# Patient Record
Sex: FEMALE | Race: WHITE | Employment: FULL TIME | URBAN - METROPOLITAN AREA
[De-identification: names, ages, dates, MRNs, and addresses within clinical notes are randomized per-mention and may not be internally consistent; named-entity substitution may affect disease eponyms.]

---

## 2017-10-17 PROBLEM — E55.9 VITAMIN D DEFICIENCY: Status: ACTIVE | Noted: 2017-10-17

## 2017-11-02 PROBLEM — R10.11 RUQ PAIN: Status: ACTIVE | Noted: 2017-11-02

## 2018-09-27 DIAGNOSIS — Z00.00 ANNUAL PHYSICAL EXAM: ICD-10-CM

## 2018-09-27 DIAGNOSIS — E55.9 VITAMIN D DEFICIENCY: ICD-10-CM

## 2018-09-27 PROBLEM — L03.115 CELLULITIS OF RIGHT LEG: Status: ACTIVE | Noted: 2018-09-27

## 2018-09-27 LAB
BASOPHILS ABSOLUTE: 0.1 K/UL (ref 0–0.2)
BASOPHILS RELATIVE PERCENT: 0.9 %
EOSINOPHILS ABSOLUTE: 0.1 K/UL (ref 0–0.6)
EOSINOPHILS RELATIVE PERCENT: 1.2 %
HCT VFR BLD CALC: 41.6 % (ref 36–48)
HEMOGLOBIN: 13.7 G/DL (ref 12–16)
LYMPHOCYTES ABSOLUTE: 2.1 K/UL (ref 1–5.1)
LYMPHOCYTES RELATIVE PERCENT: 30.4 %
MCH RBC QN AUTO: 30.4 PG (ref 26–34)
MCHC RBC AUTO-ENTMCNC: 32.9 G/DL (ref 31–36)
MCV RBC AUTO: 92.6 FL (ref 80–100)
MONOCYTES ABSOLUTE: 0.5 K/UL (ref 0–1.3)
MONOCYTES RELATIVE PERCENT: 7.1 %
NEUTROPHILS ABSOLUTE: 4.2 K/UL (ref 1.7–7.7)
NEUTROPHILS RELATIVE PERCENT: 60.4 %
PDW BLD-RTO: 13.8 % (ref 12.4–15.4)
PLATELET # BLD: 198 K/UL (ref 135–450)
PMV BLD AUTO: 9.9 FL (ref 5–10.5)
RBC # BLD: 4.49 M/UL (ref 4–5.2)
WBC # BLD: 6.9 K/UL (ref 4–11)

## 2018-09-28 LAB
A/G RATIO: 1.4 (ref 1.1–2.2)
ALBUMIN SERPL-MCNC: 3.9 G/DL (ref 3.4–5)
ALP BLD-CCNC: 72 U/L (ref 40–129)
ALT SERPL-CCNC: 20 U/L (ref 10–40)
ANION GAP SERPL CALCULATED.3IONS-SCNC: 15 MMOL/L (ref 3–16)
AST SERPL-CCNC: 19 U/L (ref 15–37)
BILIRUB SERPL-MCNC: 0.4 MG/DL (ref 0–1)
BUN BLDV-MCNC: 10 MG/DL (ref 7–20)
CALCIUM SERPL-MCNC: 9.1 MG/DL (ref 8.3–10.6)
CHLORIDE BLD-SCNC: 102 MMOL/L (ref 99–110)
CHOLESTEROL, TOTAL: 142 MG/DL (ref 0–199)
CO2: 20 MMOL/L (ref 21–32)
CREAT SERPL-MCNC: 1 MG/DL (ref 0.6–1.1)
GFR AFRICAN AMERICAN: >60
GFR NON-AFRICAN AMERICAN: >60
GLOBULIN: 2.7 G/DL
GLUCOSE BLD-MCNC: 97 MG/DL (ref 70–99)
HDLC SERPL-MCNC: 45 MG/DL (ref 40–60)
LDL CHOLESTEROL CALCULATED: 84 MG/DL
MAGNESIUM: 2.1 MG/DL (ref 1.8–2.4)
POTASSIUM SERPL-SCNC: 4.3 MMOL/L (ref 3.5–5.1)
SODIUM BLD-SCNC: 137 MMOL/L (ref 136–145)
T3 FREE: 3 PG/ML (ref 2.3–4.2)
T4 FREE: 1.1 NG/DL (ref 0.9–1.8)
TOTAL PROTEIN: 6.6 G/DL (ref 6.4–8.2)
TRIGL SERPL-MCNC: 66 MG/DL (ref 0–150)
TSH SERPL DL<=0.05 MIU/L-ACNC: 3.74 UIU/ML (ref 0.27–4.2)
VITAMIN D 25-HYDROXY: 22.6 NG/ML
VLDLC SERPL CALC-MCNC: 13 MG/DL

## 2019-12-19 DIAGNOSIS — L67.8 ABNORMAL FACIAL HAIR: ICD-10-CM

## 2019-12-19 DIAGNOSIS — R74.01 ELEVATED AST (SGOT): ICD-10-CM

## 2019-12-19 LAB
A/G RATIO: 1.4 (ref 1.1–2.2)
ALBUMIN SERPL-MCNC: 3.7 G/DL (ref 3.4–5)
ALP BLD-CCNC: 54 U/L (ref 40–129)
ALT SERPL-CCNC: 8 U/L (ref 10–40)
ANION GAP SERPL CALCULATED.3IONS-SCNC: 12 MMOL/L (ref 3–16)
AST SERPL-CCNC: 11 U/L (ref 15–37)
BASOPHILS ABSOLUTE: 0 K/UL (ref 0–0.2)
BASOPHILS RELATIVE PERCENT: 0.3 %
BILIRUB SERPL-MCNC: 0.3 MG/DL (ref 0–1)
BUN BLDV-MCNC: 12 MG/DL (ref 7–20)
CALCIUM SERPL-MCNC: 8.8 MG/DL (ref 8.3–10.6)
CHLORIDE BLD-SCNC: 104 MMOL/L (ref 99–110)
CHOLESTEROL, TOTAL: 149 MG/DL (ref 0–199)
CO2: 25 MMOL/L (ref 21–32)
CREAT SERPL-MCNC: 0.8 MG/DL (ref 0.6–1.1)
EOSINOPHILS ABSOLUTE: 0.1 K/UL (ref 0–0.6)
EOSINOPHILS RELATIVE PERCENT: 1.4 %
GFR AFRICAN AMERICAN: >60
GFR NON-AFRICAN AMERICAN: >60
GLOBULIN: 2.7 G/DL
GLUCOSE BLD-MCNC: 96 MG/DL (ref 70–99)
HCT VFR BLD CALC: 40.9 % (ref 36–48)
HDLC SERPL-MCNC: 62 MG/DL (ref 40–60)
HEMOGLOBIN: 13.4 G/DL (ref 12–16)
LDL CHOLESTEROL CALCULATED: 71 MG/DL
LYMPHOCYTES ABSOLUTE: 2.6 K/UL (ref 1–5.1)
LYMPHOCYTES RELATIVE PERCENT: 31 %
MCH RBC QN AUTO: 30.1 PG (ref 26–34)
MCHC RBC AUTO-ENTMCNC: 32.8 G/DL (ref 31–36)
MCV RBC AUTO: 91.9 FL (ref 80–100)
MONOCYTES ABSOLUTE: 0.4 K/UL (ref 0–1.3)
MONOCYTES RELATIVE PERCENT: 5.3 %
NEUTROPHILS ABSOLUTE: 5.2 K/UL (ref 1.7–7.7)
NEUTROPHILS RELATIVE PERCENT: 62 %
PDW BLD-RTO: 13 % (ref 12.4–15.4)
PLATELET # BLD: 207 K/UL (ref 135–450)
PMV BLD AUTO: 10.3 FL (ref 5–10.5)
POTASSIUM SERPL-SCNC: 4.8 MMOL/L (ref 3.5–5.1)
RBC # BLD: 4.45 M/UL (ref 4–5.2)
SODIUM BLD-SCNC: 141 MMOL/L (ref 136–145)
TOTAL PROTEIN: 6.4 G/DL (ref 6.4–8.2)
TRIGL SERPL-MCNC: 81 MG/DL (ref 0–150)
TSH SERPL DL<=0.05 MIU/L-ACNC: 2.5 UIU/ML (ref 0.27–4.2)
VLDLC SERPL CALC-MCNC: 16 MG/DL
WBC # BLD: 8.4 K/UL (ref 4–11)

## 2019-12-21 LAB — TESTOSTERONE TOTAL: 31 NG/DL (ref 20–70)

## 2021-02-25 PROBLEM — O14.93 PRE-ECLAMPSIA IN THIRD TRIMESTER: Status: ACTIVE | Noted: 2021-02-25

## 2023-07-17 ENCOUNTER — OFFICE VISIT (OUTPATIENT)
Dept: FAMILY MEDICINE CLINIC | Age: 33
End: 2023-07-17
Payer: COMMERCIAL

## 2023-07-17 VITALS
HEIGHT: 66 IN | BODY MASS INDEX: 40.5 KG/M2 | OXYGEN SATURATION: 97 % | TEMPERATURE: 97.1 F | RESPIRATION RATE: 16 BRPM | SYSTOLIC BLOOD PRESSURE: 123 MMHG | HEART RATE: 88 BPM | DIASTOLIC BLOOD PRESSURE: 83 MMHG | WEIGHT: 252 LBS

## 2023-07-17 DIAGNOSIS — Z01.818 PREOPERATIVE EXAMINATION: Primary | ICD-10-CM

## 2023-07-17 DIAGNOSIS — E66.01 MORBID OBESITY WITH BMI OF 40.0-44.9, ADULT (HCC): ICD-10-CM

## 2023-07-17 PROCEDURE — 99213 OFFICE O/P EST LOW 20 MIN: CPT | Performed by: STUDENT IN AN ORGANIZED HEALTH CARE EDUCATION/TRAINING PROGRAM

## 2023-07-17 SDOH — ECONOMIC STABILITY: INCOME INSECURITY: HOW HARD IS IT FOR YOU TO PAY FOR THE VERY BASICS LIKE FOOD, HOUSING, MEDICAL CARE, AND HEATING?: NOT HARD AT ALL

## 2023-07-17 SDOH — ECONOMIC STABILITY: HOUSING INSECURITY
IN THE LAST 12 MONTHS, WAS THERE A TIME WHEN YOU DID NOT HAVE A STEADY PLACE TO SLEEP OR SLEPT IN A SHELTER (INCLUDING NOW)?: NO

## 2023-07-17 SDOH — ECONOMIC STABILITY: FOOD INSECURITY: WITHIN THE PAST 12 MONTHS, YOU WORRIED THAT YOUR FOOD WOULD RUN OUT BEFORE YOU GOT MONEY TO BUY MORE.: NEVER TRUE

## 2023-07-17 SDOH — ECONOMIC STABILITY: FOOD INSECURITY: WITHIN THE PAST 12 MONTHS, THE FOOD YOU BOUGHT JUST DIDN'T LAST AND YOU DIDN'T HAVE MONEY TO GET MORE.: NEVER TRUE

## 2023-07-17 ASSESSMENT — PATIENT HEALTH QUESTIONNAIRE - PHQ9
SUM OF ALL RESPONSES TO PHQ QUESTIONS 1-9: 0
SUM OF ALL RESPONSES TO PHQ9 QUESTIONS 1 & 2: 0
7. TROUBLE CONCENTRATING ON THINGS, SUCH AS READING THE NEWSPAPER OR WATCHING TELEVISION: 0
6. FEELING BAD ABOUT YOURSELF - OR THAT YOU ARE A FAILURE OR HAVE LET YOURSELF OR YOUR FAMILY DOWN: 0
8. MOVING OR SPEAKING SO SLOWLY THAT OTHER PEOPLE COULD HAVE NOTICED. OR THE OPPOSITE, BEING SO FIGETY OR RESTLESS THAT YOU HAVE BEEN MOVING AROUND A LOT MORE THAN USUAL: 0
2. FEELING DOWN, DEPRESSED OR HOPELESS: 0
SUM OF ALL RESPONSES TO PHQ QUESTIONS 1-9: 0
SUM OF ALL RESPONSES TO PHQ QUESTIONS 1-9: 0
5. POOR APPETITE OR OVEREATING: 0
3. TROUBLE FALLING OR STAYING ASLEEP: 0
1. LITTLE INTEREST OR PLEASURE IN DOING THINGS: 0
SUM OF ALL RESPONSES TO PHQ QUESTIONS 1-9: 0
1. LITTLE INTEREST OR PLEASURE IN DOING THINGS: 0
SUM OF ALL RESPONSES TO PHQ QUESTIONS 1-9: 0
SUM OF ALL RESPONSES TO PHQ QUESTIONS 1-9: 0
4. FEELING TIRED OR HAVING LITTLE ENERGY: 0
SUM OF ALL RESPONSES TO PHQ QUESTIONS 1-9: 0
2. FEELING DOWN, DEPRESSED OR HOPELESS: 0
SUM OF ALL RESPONSES TO PHQ QUESTIONS 1-9: 0
9. THOUGHTS THAT YOU WOULD BE BETTER OFF DEAD, OR OF HURTING YOURSELF: 0
SUM OF ALL RESPONSES TO PHQ9 QUESTIONS 1 & 2: 0

## 2023-07-17 NOTE — PROGRESS NOTES
(Circ. [id number], 1999), the patient's risk factors for cardiac complications : RCI RISK CLASS I (0 risk factors, risk of major cardiac compl. appr. 0.5%)    Current medications which may produce withdrawal symptoms if withheld perioperatively: none      Diagnosis Orders   1. Preoperative examination        2. Morbid obesity with BMI of 40.0-44.9, adult ([address])             Plan:   1. Preoperative examination  Patient is a healthy 34year old female. - Presently Clinically Stable for Scheduled Surgery.  - No contraindications to planned surgery; Pending preop workup and Exam via [facility]     2. Morbid obesity with BMI of 40.0-44.9, adult ([address])  Plan for gastric sleeve .     Gwendolyn Brambila MD  7/17/23

## 2023-09-26 ENCOUNTER — OFFICE VISIT (OUTPATIENT)
Dept: FAMILY MEDICINE CLINIC | Age: 33
End: 2023-09-26
Payer: COMMERCIAL

## 2023-09-26 VITALS
WEIGHT: 226.2 LBS | OXYGEN SATURATION: 98 % | BODY MASS INDEX: 37.07 KG/M2 | DIASTOLIC BLOOD PRESSURE: 73 MMHG | SYSTOLIC BLOOD PRESSURE: 105 MMHG | RESPIRATION RATE: 16 BRPM | TEMPERATURE: 97.3 F | HEART RATE: 65 BPM

## 2023-09-26 DIAGNOSIS — M53.3 PAIN OF LEFT SACROILIAC JOINT: Primary | ICD-10-CM

## 2023-09-26 PROCEDURE — 99213 OFFICE O/P EST LOW 20 MIN: CPT | Performed by: FAMILY MEDICINE

## 2023-09-26 RX ORDER — TIZANIDINE 4 MG/1
4 TABLET ORAL EVERY 8 HOURS PRN
Qty: 9 TABLET | Refills: 0 | Status: SHIPPED | OUTPATIENT
Start: 2023-09-26 | End: 2023-09-29

## 2023-09-26 RX ORDER — LIDOCAINE AND PRILOCAINE 25; 25 MG/G; MG/G
CREAM TOPICAL
Qty: 1 EACH | Refills: 0 | Status: SHIPPED | OUTPATIENT
Start: 2023-09-26

## 2023-09-26 RX ORDER — METHYLPREDNISOLONE 4 MG/1
TABLET ORAL
Qty: 1 KIT | Refills: 0 | Status: SHIPPED | OUTPATIENT
Start: 2023-09-26 | End: 2023-10-02

## 2023-09-26 RX ORDER — MAGNESIUM 200 MG
TABLET ORAL
COMMUNITY
Start: 2023-09-07

## 2023-09-26 ASSESSMENT — ENCOUNTER SYMPTOMS: BACK PAIN: 1

## 2023-09-28 ENCOUNTER — TELEPHONE (OUTPATIENT)
Dept: FAMILY MEDICINE CLINIC | Age: 33
End: 2023-09-28

## 2023-09-28 NOTE — TELEPHONE ENCOUNTER
Pharmacy called to state they need a frequency on the lidocaine-prilocaine RX sent 09/26 for insurance purposes.      lidocaine-prilocaine (EMLA) 2.5-2.5 % cream

## 2023-10-04 DIAGNOSIS — M53.3 PAIN OF LEFT SACROILIAC JOINT: Primary | ICD-10-CM

## 2023-10-13 ENCOUNTER — OFFICE VISIT (OUTPATIENT)
Dept: ORTHOPEDIC SURGERY | Age: 33
End: 2023-10-13
Payer: COMMERCIAL

## 2023-10-13 ENCOUNTER — TELEPHONE (OUTPATIENT)
Dept: ORTHOPEDIC SURGERY | Age: 33
End: 2023-10-13

## 2023-10-13 VITALS — HEIGHT: 66 IN | BODY MASS INDEX: 36.32 KG/M2 | WEIGHT: 226 LBS

## 2023-10-13 DIAGNOSIS — M54.50 CHRONIC LOW BACK PAIN, UNSPECIFIED BACK PAIN LATERALITY, UNSPECIFIED WHETHER SCIATICA PRESENT: Primary | ICD-10-CM

## 2023-10-13 DIAGNOSIS — G89.29 CHRONIC LOW BACK PAIN, UNSPECIFIED BACK PAIN LATERALITY, UNSPECIFIED WHETHER SCIATICA PRESENT: Primary | ICD-10-CM

## 2023-10-13 DIAGNOSIS — M62.830 SPASM OF MUSCLE OF LOWER BACK: ICD-10-CM

## 2023-10-13 DIAGNOSIS — G89.29 CHRONIC SI JOINT PAIN: ICD-10-CM

## 2023-10-13 DIAGNOSIS — M53.3 CHRONIC SI JOINT PAIN: ICD-10-CM

## 2023-10-13 DIAGNOSIS — M25.552 LEFT HIP PAIN: ICD-10-CM

## 2023-10-13 DIAGNOSIS — M51.37 DDD (DEGENERATIVE DISC DISEASE), LUMBOSACRAL: ICD-10-CM

## 2023-10-13 PROCEDURE — 99203 OFFICE O/P NEW LOW 30 MIN: CPT | Performed by: PHYSICIAN ASSISTANT

## 2023-10-13 RX ORDER — PREDNISONE 10 MG/1
TABLET ORAL
Qty: 50 TABLET | Refills: 0 | Status: SHIPPED | OUTPATIENT
Start: 2023-10-13 | End: 2023-10-24

## 2023-10-13 RX ORDER — LIDOCAINE 50 MG/G
1 PATCH TOPICAL EVERY 12 HOURS
Qty: 14 PATCH | Refills: 0 | Status: SHIPPED | OUTPATIENT
Start: 2023-10-13

## 2023-10-13 RX ORDER — METHOCARBAMOL 750 MG/1
750 TABLET, FILM COATED ORAL EVERY 12 HOURS PRN
Qty: 20 TABLET | Refills: 0 | Status: SHIPPED | OUTPATIENT
Start: 2023-10-13 | End: 2023-10-23

## 2023-10-13 NOTE — PROGRESS NOTES
Referring Provider:  Natalya Teague DO    CHIEF COMPLAINT:    Chief Complaint   Patient presents with    New Patient     LUMBAR       HISTORY OF PRESENT ILLNESS:    Ms. Charlie Bullock  is a pleasant 28 y.o. female here for consultation regarding her LBP. Patient has her low back pain for the past 2 to 3 years with no associated injury. Is predominantly in the left side. 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 This all stems from an injury at work when she was 23. 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 Mostly with his job she is sitting throughout the day. She describes her symptoms as constant, achy and stabbing. Symptoms are 8/10 and radiates into the left side of the glutes. It is worse with trunk rotation. Symptoms are also worse with prolonged standing, position changes, leaning forward, and ambulating. Symptoms are improved to a 6/10 with sitting or lying down. She denies any fevers, chills, recent travel, IV drug use, illicit drug use, saddle anesthesia, or bowel or bladder dysfunction.         Current/Past Treatment:   Physical Therapy: None, home exercise program for the past 6 months  Chiropractic:  None   Injection:  None   Medications:    NSAIDS:  Aleve, IBP  Muscle relaxer:  NONE  Steriods:   MDP  Neuropathic medications:  NONE  Opioids: NONE  Other:  MPHx of 4 epidurals during past pregnancies without relief of low back or labor pains   Surgery/Consult NONE      Past Medical History:   Past Medical History:   Diagnosis Date    Chicken pox     Chronic back pain     Since age 23    DDD (degenerative disc disease), cervical     Depression     no meds    HPV (human papilloma virus) anogenital infection 2008    MRSA exposure         Past Surgical History:     Past Surgical History:   Procedure Laterality Date    EYE SURGERY      Lasik    LASRADHA NICHOLSON

## 2023-10-13 NOTE — TELEPHONE ENCOUNTER
General Question     Subject: MRI REFERRAL  Patient and /or Facility Request: Wing Hassan  Contact Number: [phone]    PT REQ REFERRAL FOR MRI [facility] [place] FAX # [phone]

## 2023-10-16 NOTE — TELEPHONE ENCOUNTER
S/W the patient informing her I was calling regarding her message. Patient was informed I have to change the location on the PA with her insurance. Patient was informed once I get it done that I will fax over everything to [address] [place] [address], [address] [place] [address]  [phone]  FAX: [phone]    Patient was also informed they will reach out to her and schedule her appointment. Patient voiced understanding.

## 2023-10-17 ENCOUNTER — TELEPHONE (OUTPATIENT)
Dept: ORTHOPEDIC SURGERY | Age: 33
End: 2023-10-17

## 2023-10-17 NOTE — TELEPHONE ENCOUNTER
Faxing Patient Information     Facility Name: [facility] Name: Andrea Wong  Contact Number:   Facility Fax Number: [address] ([phone]) CALLED STATING THAT THE PT WANTS TO HAVE HER MRI DONE AT [facility], [address] #[phone]. [facility] NEEDS AN ORDER FAXED TO THEM OF AN  MRI OF THE LUMBAR SPINE WITHOUT CONTRAST.

## 2023-10-18 ENCOUNTER — HOSPITAL ENCOUNTER (OUTPATIENT)
Dept: PHYSICAL THERAPY | Age: 33
Setting detail: THERAPIES SERIES
Discharge: HOME | End: 2023-10-18
Payer: COMMERCIAL

## 2023-10-18 DIAGNOSIS — M53.3 SACROILIAC JOINT DYSFUNCTION: Primary | ICD-10-CM

## 2023-10-18 DIAGNOSIS — G89.29 CHRONIC LEFT-SIDED LOW BACK PAIN WITHOUT SCIATICA: ICD-10-CM

## 2023-10-18 DIAGNOSIS — M54.50 CHRONIC LEFT-SIDED LOW BACK PAIN WITHOUT SCIATICA: ICD-10-CM

## 2023-10-18 PROCEDURE — 97161 PT EVAL LOW COMPLEX 20 MIN: CPT

## 2023-10-18 PROCEDURE — 97110 THERAPEUTIC EXERCISES: CPT

## 2023-10-18 NOTE — PLAN OF CARE
allow independence in all self-care activities. Patient will increase core/lumbopelvic function to allow independence in all self-care activities. Patient will perform normal ADLs without symptoms 75% of the time             Status: [] Progressing: [] Met: [] Not Met: [] Adjusted    TREATMENT PLAN     Frequency/Duration: 1-2x/week for  12  weeks for the following treatment interventions:    Interventions:  [x] Therapeutic exercise including: strength training, ROM, including postural re-education. [x] NMR activation and proprioception, including postural re-education. [x] Manual therapy as indicated to include: PROM, Gr I-IV mobilizations, and STM  [x] Modalities as needed that may include: Cryotherapy and Electrical Stimulation  [x] Patient education on joint protection, postural re-education, activity modification, progression of HEP. HEP instruction: Refer to HEP instructions outlined on the flowsheet on 10/18/2023.     Electronically Signed by Dianna Cai PT  Date: 10/18/2023

## 2023-10-18 NOTE — FLOWSHEET NOTE
[address] [place] [address] and Therapy [address] [place] [address], [address] [place] [address] office: [phone] fax: [phone]      Physical Therapy: TREATMENT/PROGRESS NOTE   Patient: Johnson Greenwood (56 y.o. female)   Treatment Date: 10/18/2023   :  1990 MRN: [id number]   Visit #: 1   Insurance Allowable Auth Needed   60 []Yes    []No    Insurance: Payor: [place] / Plan: Susan Mandril PPO / Product Type: *No Product type* /   Insurance ID: [id number] - ([facility])  Secondary Insurance (if applicable):    Treatment Diagnosis:     ICD-10-CM    1.  Sacroiliac joint dysfunction  M53.3          Medical Diagnosis:    Chronic low back pain, unspecified back pain laterality, unspecified whether sciatica present [M54.50, G89.29]  Chronic SI joint pain [M53.3, G89.29]   Referring Physician: Holland Carranza PA-C  PCP: Carolyn He DO                             Plan of care signed (Y/N): N    Date of Patient follow up with Physician: prn     Progress Report/POC: EVAL today  POC update due: 2023 (OR 10 visits /OR Alfredia Mole, whichever is less)    Precautions/ Contra-indications:                                                                                          Latex allergy:  NO  Pacemaker:    NO  Contraindications for Manipulation: None  Other:  (Cut and paste Precautions/Contraindications from [employer])    Preferred Language for Healthcare:   [x]English       []other:    SUBJECTIVE EXAMINATION     Patient Report/Comments: see eval     Test used Initial score  10/18/23 10/18/2023   Pain Summary VAS 7-9    Functional questionnaire Modified Oswestry [place]    Other:                OBJECTIVE EXAMINATION     Observation:     Test measurements: see eval    Exercises/Interventions:     Therapeutic Ex (30925) Homa Andrews re-education (20530) resistance Sets/time Reps Notes/Cues/Progressions   Hip ADD iso  5\" 10    HL clamshell RTB 3 10    LTR  1 10                                              Pt edu:

## 2023-10-19 NOTE — TELEPHONE ENCOUNTER
S/W Michelle Goodwin  regarding MRI LUMBAR SPINE WO CONTRAST approval and authorization being valid until 11/11/23. Patient was instructed that their MRI needs to be scheduled at [facility] The patient was instructed to contact the facility to schedule . Patient was informed to follow up back in the office with Nidia Miller PA-C to go over MRI results. She voiced understanding.

## 2023-10-23 ENCOUNTER — APPOINTMENT (OUTPATIENT)
Dept: PHYSICAL THERAPY | Age: 33
End: 2023-10-23
Payer: COMMERCIAL

## 2023-10-25 ENCOUNTER — HOSPITAL ENCOUNTER (OUTPATIENT)
Dept: PHYSICAL THERAPY | Age: 33
Setting detail: THERAPIES SERIES
Discharge: HOME | End: 2023-10-25
Payer: COMMERCIAL

## 2023-10-25 PROCEDURE — G0283 ELEC STIM OTHER THAN WOUND: HCPCS

## 2023-10-25 PROCEDURE — 97140 MANUAL THERAPY 1/> REGIONS: CPT

## 2023-10-25 PROCEDURE — 97110 THERAPEUTIC EXERCISES: CPT

## 2023-10-25 NOTE — FLOWSHEET NOTE
[address] [place] [address] and Therapy [address] [place] [address]., [address] [facility], [address] [place] [address] office: [phone] fax: [phone]      Physical Therapy: TREATMENT/PROGRESS NOTE   Patient: Anthony Ureña (31 y.o. female)   Treatment Date: 10/25/2023   :  1990 MRN: [id number]   Visit #: 2   Insurance Allowable Auth Needed   60 []Yes    []No    Insurance: Payor: Michell Guerrero / Plan: Yvon Clancy PPO / Product Type: *No Product type* /   Insurance ID: [id number] - ([facility])  Secondary Insurance (if applicable):    Treatment Diagnosis:     ICD-10-CM    1. Sacroiliac joint dysfunction  M53.3          Medical Diagnosis:    Chronic low back pain, unspecified back pain laterality, unspecified whether sciatica present [M54.50, G89.29]  Chronic SI joint pain [M53.3, G89.29]   Referring Physician: Nathan GILLESPIE PA-C  PCP: Marshall Lopez DO                             Plan of care signed (Y/N): Y    Date of Patient follow up with Physician: prn     Progress Report/POC: NO  POC update due: 2023 (OR 10 visits /OR [address], whichever is less)    Precautions/ Contra-indications:                                                                                          Latex allergy:  NO  Pacemaker:    NO  Contraindications for Manipulation: None  Other:     Preferred Language for Healthcare:   [x]English       []other:    SUBJECTIVE EXAMINATION     Patient Report/Comments: Pt states she has been doing ok. Overall pain remains the same intensity but maybe a little less frequent. Pt does feel a little more movement. Pt has been compliant with HEP and does have ? pain. Pt reports pain feels very nn like, feels pinchy and sharp, especially in to L glute.       Test used Initial score  10/18/23 10/25/2023   Pain Summary VAS 6    Functional questionnaire Modified Oswestry NV    Other:                OBJECTIVE EXAMINATION     Observation: pt is slow to move, guarded with her mobility    Test

## 2023-11-01 ENCOUNTER — HOSPITAL ENCOUNTER (OUTPATIENT)
Dept: PHYSICAL THERAPY | Age: 33
Setting detail: THERAPIES SERIES
Discharge: HOME | End: 2023-11-01
Payer: COMMERCIAL

## 2023-11-01 PROCEDURE — 97140 MANUAL THERAPY 1/> REGIONS: CPT

## 2023-11-01 PROCEDURE — 97110 THERAPEUTIC EXERCISES: CPT

## 2023-11-01 PROCEDURE — G0283 ELEC STIM OTHER THAN WOUND: HCPCS

## 2023-11-01 NOTE — FLOWSHEET NOTE
[address] [place] [address] and Therapy [address] [place] [address], [facility] [address] office: [phone] fax: [phone]      Physical Therapy: TREATMENT/PROGRESS NOTE   Patient: Odilia Martinez (46 y.o. female)   Treatment Date: 2023   :  1990 MRN: [id number]   Visit #: 3   Insurance Allowable Auth Needed   60 []Yes    []No    Insurance: Payor: [facility] / Plan: Joanie Calloway PPO / Product Type: *No Product type* /   Insurance ID: [id number] - ([facility])  Secondary Insurance (if applicable):    Treatment Diagnosis:     ICD-10-CM    1. Sacroiliac joint dysfunction  M53.3          Medical Diagnosis:    Chronic low back pain, unspecified back pain laterality, unspecified whether sciatica present [M54.50, G89.29]  Chronic SI joint pain [M53.3, G89.29]   Referring Physician: Ghulam GILLESPIE PA-C  PCP: Fady Lundy DO                             Plan of care signed (Y/N): Y    Date of Patient follow up with Physician: prn     Progress Report/POC: NO  POC update due: 2023 (OR 10 visits /OR Javier Crawford, whichever is less)    Precautions/ Contra-indications:                                                                                          Latex allergy:  NO  Pacemaker:    NO  Contraindications for Manipulation: None  Other:     Preferred Language for Healthcare:   [x]English       []other:    SUBJECTIVE EXAMINATION     Patient Report/Comments: Pt reports she was kneeling next to her bed last night and felt a pop on the left side of her low back and is now back to feeling constant pain, L sided. Pt reports she was called by [employer] company.       Test used Initial score  10/18/23 2023   Pain Summary VAS 9 7   Functional questionnaire Modified Oswestry NV    Other:                OBJECTIVE EXAMINATION     Observation: pt is slow to move, guarded with her mobility    Test measurements: see eval    Exercises/Interventions:     Therapeutic Ex (54136) Etienne Amaral

## 2023-11-03 ENCOUNTER — OFFICE VISIT (OUTPATIENT)
Dept: ORTHOPEDIC SURGERY | Age: 33
End: 2023-11-03
Payer: COMMERCIAL

## 2023-11-03 VITALS — BODY MASS INDEX: 36.32 KG/M2 | WEIGHT: 226 LBS | HEIGHT: 66 IN

## 2023-11-03 DIAGNOSIS — G89.29 CHRONIC LOW BACK PAIN, UNSPECIFIED BACK PAIN LATERALITY, UNSPECIFIED WHETHER SCIATICA PRESENT: ICD-10-CM

## 2023-11-03 DIAGNOSIS — M54.50 CHRONIC LOW BACK PAIN, UNSPECIFIED BACK PAIN LATERALITY, UNSPECIFIED WHETHER SCIATICA PRESENT: ICD-10-CM

## 2023-11-03 DIAGNOSIS — M51.26 LUMBAR HERNIATED DISC: ICD-10-CM

## 2023-11-03 DIAGNOSIS — M53.3 CHRONIC SI JOINT PAIN: ICD-10-CM

## 2023-11-03 DIAGNOSIS — M51.26 PROTRUSION OF LUMBAR INTERVERTEBRAL DISC: Primary | ICD-10-CM

## 2023-11-03 DIAGNOSIS — M51.37 DDD (DEGENERATIVE DISC DISEASE), LUMBOSACRAL: ICD-10-CM

## 2023-11-03 DIAGNOSIS — G89.29 CHRONIC SI JOINT PAIN: ICD-10-CM

## 2023-11-03 PROCEDURE — 99213 OFFICE O/P EST LOW 20 MIN: CPT | Performed by: PHYSICIAN ASSISTANT

## 2023-11-08 ENCOUNTER — APPOINTMENT (OUTPATIENT)
Dept: PHYSICAL THERAPY | Age: 33
End: 2023-11-08
Payer: COMMERCIAL

## 2023-11-09 ENCOUNTER — HOSPITAL ENCOUNTER (OUTPATIENT)
Dept: PHYSICAL THERAPY | Age: 33
Setting detail: THERAPIES SERIES
Discharge: HOME | End: 2023-11-09
Payer: COMMERCIAL

## 2023-11-09 PROCEDURE — 97110 THERAPEUTIC EXERCISES: CPT

## 2023-11-09 PROCEDURE — 97140 MANUAL THERAPY 1/> REGIONS: CPT

## 2023-11-09 NOTE — FLOWSHEET NOTE
[address] and Therapy [address] [place] [address], [facility] [address] office: [phone] fax: [phone]      Physical Therapy: TREATMENT/PROGRESS NOTE   Patient: Miguelina Martinez (51 y.o. female)   Treatment Date: 2023   :  1990 MRN: [id number]   Visit #: 4  Insurance Allowable Auth Needed   60 []Yes    [x]No    Insurance: Payor: Renee Moran / Plan: Renee Rincon [place] PPO / Product Type: *No Product type* /   Insurance ID: [id number] - ([facility])  Secondary Insurance (if applicable):    Treatment Diagnosis:     ICD-10-CM    1. Sacroiliac joint dysfunction  M53.3          Medical Diagnosis:    Chronic low back pain, unspecified back pain laterality, unspecified whether sciatica present [M54.50, G89.29]  Chronic SI joint pain [M53.3, G89.29]   Referring Physician: Ximena GILLESPIE PA-C  PCP: Reynold Alford DO                             Plan of care signed (Y/N): Y    Date of Patient follow up with Physician: prn     Progress Report/POC: NO  POC update due: 2023 (OR 10 visits /OR [address], whichever is less)    Precautions/ Contra-indications:                                                                                          Latex allergy:  NO  Pacemaker:    NO  Contraindications for Manipulation: None  Other:     Preferred Language for Healthcare:   [x]English       []other:    SUBJECTIVE EXAMINATION     Patient Report/Comments:  Pt notes that she is feeling a little worse today. She notes that she was rolling over in bed a few nights ago and had a sudden increase in pain that has not regressed. Pt notes that prior to this she was at a steady 2-3/10 pain.       Test used Initial score  10/18/23 2023   Pain Summary VAS 9 5-6   Functional questionnaire Modified Oswestry NV    Other:                OBJECTIVE EXAMINATION     Observation: pt is slow to move, guarded with her mobility    Test measurements: see eval    Exercises/Interventions:

## 2023-11-10 NOTE — PROGRESS NOTES
Additional Examinations:   RIGHT LOWER EXTREMITY: Inspection/examination of the right lower extremity does not show any tenderness, deformity or injury. Range of motion is unremarkable. There is no gross instability. There are no rashes, ulcerations or lesions. Strength and tone are normal.  LEFT LOWER EXTREMITY:  Inspection/examination of the left lower extremity does not show any tenderness, deformity or injury. Range of motion is unremarkable. There is no gross instability. There are no rashes, ulcerations or lesions. Strength and tone are normal.      Diagnostic Testing:    Reviewed   MRI of the lumbar spine  Conclusion: L4-L5 shallow central disc protrusion asymmetric to the right with annular fissure gently deforms the ventral dural sac. Mild spinal stenosis. Impression:    Diagnosis Orders   1. Protrusion of lumbar intervertebral disc  Alexia Broussard MD, Orthopedic Surgery (Spine), [place]      2. DDD (degenerative disc disease), lumbosacral  Alexia Broussard MD, Orthopedic Surgery (Spine), [place]      3. Chronic SI joint pain  Alexia Broussard MD, Orthopedic Surgery (Spine), [place]      4. Chronic low back pain, unspecified back pain laterality, unspecified whether sciatica present        5. Lumbar herniated disc              Plan:      1. Medications: No further recommendations for new medications. 2. PT:  Encouraged to continue with HEP/physical therapy. 3. Further studies:   No further studies to be obtained    4. Interventional:  We discussed pursuing a lumbar epidural steroid injection to address the pain. Radiologic imaging and symptoms confirm the pain etiology. Risks, benefits and alternatives of interventional options were discussed. These include and are not limited to bleeding, infection, spinal headache, nerve injury, increased pain and lack of pain relief. The patient verbalized understanding and would like to proceed.   The patient will be

## 2023-11-16 ENCOUNTER — HOSPITAL ENCOUNTER (OUTPATIENT)
Dept: PHYSICAL THERAPY | Age: 33
Setting detail: THERAPIES SERIES
Discharge: HOME | End: 2023-11-16
Payer: COMMERCIAL

## 2023-11-16 PROCEDURE — 97140 MANUAL THERAPY 1/> REGIONS: CPT

## 2023-11-16 PROCEDURE — 97110 THERAPEUTIC EXERCISES: CPT

## 2023-11-16 PROCEDURE — 97112 NEUROMUSCULAR REEDUCATION: CPT

## 2023-11-16 NOTE — FLOWSHEET NOTE
[address] [place] [address] and Therapy [address] [place] [address] office: [phone] fax: [phone]      Physical Therapy: TREATMENT/PROGRESS NOTE   Patient: Charlie Bullock (32 y.o. female)   Treatment Date: 2023   :  1990 MRN: [id number]   Visit #: 5  Insurance Allowable Auth Needed   60 []Yes    [x]No    Insurance: Payor: Angel Nichols / Plan: Angel TORRES PPO / Product Type: *No Product type* /   Insurance ID: [id number] - ([employer])  Secondary Insurance (if applicable):    Treatment Diagnosis:     ICD-10-CM    1. Sacroiliac joint dysfunction  M53.3          Medical Diagnosis:    Chronic low back pain, unspecified back pain laterality, unspecified whether sciatica present [M54.50, G89.29]  Chronic SI joint pain [M53.3, G89.29]   Referring Physician: Kelechi Mccormick PA-C  PCP: Natalya Teague DO                             Plan of care signed (Y/N): Y    Date of Patient follow up with Physician: prn     Progress Report/POC: NO  POC update due: 2023 (OR 10 visits /OR [address], whichever is less)    Precautions/ Contra-indications:                                                                                          Latex allergy:  NO  Pacemaker:    NO  Contraindications for Manipulation: None  Other:     Preferred Language for Healthcare:   [x]English       []other:    SUBJECTIVE EXAMINATION     Patient Report/Comments:  Pt notes that she feels okay. She notes small increases in pain every now and then but feeling better than last week.       Test used Initial score  10/18/23 2023   Pain Summary VAS 9 3-4   Functional questionnaire Modified Oswestry NV    Other:                OBJECTIVE EXAMINATION     Observation: pt is slow to move, guarded with her mobility    Test measurements: see eval    Exercises/Interventions:     Therapeutic Ex (01179) Yolanda Jackson re-education (78805) resistance Sets/time Reps Notes/Cues/Progressions   Hip ADD iso

## 2023-11-26 SDOH — HEALTH STABILITY: PHYSICAL HEALTH: ON AVERAGE, HOW MANY DAYS PER WEEK DO YOU ENGAGE IN MODERATE TO STRENUOUS EXERCISE (LIKE A BRISK WALK)?: 0 DAYS

## 2023-11-26 SDOH — HEALTH STABILITY: PHYSICAL HEALTH: ON AVERAGE, HOW MANY MINUTES DO YOU ENGAGE IN EXERCISE AT THIS LEVEL?: 0 MIN

## 2023-11-26 ASSESSMENT — SOCIAL DETERMINANTS OF HEALTH (SDOH)
WITHIN THE LAST YEAR, HAVE YOU BEEN AFRAID OF YOUR PARTNER OR EX-PARTNER?: NO
WITHIN THE LAST YEAR, HAVE YOU BEEN KICKED, HIT, SLAPPED, OR OTHERWISE PHYSICALLY HURT BY YOUR PARTNER OR EX-PARTNER?: NO
WITHIN THE LAST YEAR, HAVE YOU BEEN HUMILIATED OR EMOTIONALLY ABUSED IN OTHER WAYS BY YOUR PARTNER OR EX-PARTNER?: NO
WITHIN THE LAST YEAR, HAVE TO BEEN RAPED OR FORCED TO HAVE ANY KIND OF SEXUAL ACTIVITY BY YOUR PARTNER OR EX-PARTNER?: NO

## 2023-11-27 ENCOUNTER — OFFICE VISIT (OUTPATIENT)
Dept: ORTHOPEDIC SURGERY | Age: 33
End: 2023-11-27
Payer: COMMERCIAL

## 2023-11-27 VITALS — BODY MASS INDEX: 36.32 KG/M2 | WEIGHT: 225.97 LBS | HEIGHT: 66 IN

## 2023-11-27 DIAGNOSIS — M43.06 SPONDYLOLYSIS OF LUMBAR REGION: Primary | ICD-10-CM

## 2023-11-27 PROCEDURE — 99213 OFFICE O/P EST LOW 20 MIN: CPT | Performed by: ORTHOPAEDIC SURGERY

## 2023-11-27 NOTE — PROGRESS NOTES
Mother     Heart Disease Mother     High Cholesterol Mother     Obesity Mother     Stroke Mother     Heart Attack Father 48    Heart Disease Father     High Blood Pressure Father     High Cholesterol Father     Early Death Father 48        mi    Substance Abuse Father     Obesity Sister     Substance Abuse Brother     Breast Cancer Paternal Aunt 61    Obesity Paternal Aunt     Stroke Maternal Grandmother     High Cholesterol Maternal Grandmother     High Blood Pressure Maternal Grandmother     Heart Disease Maternal Grandmother     Other Maternal Grandmother         osteoporsis    Obesity Maternal Grandmother     High Cholesterol Maternal Grandfather     High Blood Pressure Maternal Grandfather     Heart Disease Maternal Grandfather     Diabetes Maternal Grandfather     Obesity Maternal Grandfather     Cancer Paternal Grandmother 48        ovarian or cervical    Obesity Paternal Grandmother     Cancer Paternal Grandfather         lung    Heart Disease Paternal Grandfather     High Blood Pressure Paternal Grandfather     High Cholesterol Paternal Grandfather     Obesity Maternal Aunt     Obesity Maternal Aunt     Obesity Maternal Aunt     Obesity Maternal Uncle     Obesity Paternal Aunt        REVIEW OF SYSTEMS: Full ROS noted & scanned   CONSTITUTIONAL: Denies unexplained weight loss, fevers, chills or fatigue  NEUROLOGICAL: Denies unsteady gait or progressive weakness  MUSCULOSKELETAL: Denies joint swelling or redness  PSYCHOLOGICAL: Denies anxiety, depression   SKIN: Denies skin changes, delayed healing, rash, itching   HEMATOLOGIC: Denies easy bleeding or bruising  ENDOCRINE: Denies excessive thirst, urination, heat/cold  RESPIRATORY: Denies current dyspnea, cough  GI: Denies nausea, vomiting, diarrhea   : Denies bowel or bladder issues      PHYSICAL EXAM:    Vitals: Height 1.664 m (5' 5.5\"), weight 102.5 kg (225 lb 15.5 oz), last menstrual period 11/18/2023, not currently breastfeeding.     GENERAL

## 2023-11-30 ENCOUNTER — HOSPITAL ENCOUNTER (OUTPATIENT)
Dept: PHYSICAL THERAPY | Age: 33
Setting detail: THERAPIES SERIES
Discharge: HOME | End: 2023-11-30
Payer: COMMERCIAL

## 2023-11-30 NOTE — FLOWSHEET NOTE
Physical Therapy  Cancellation/No-show Note  Patient Name:  Donell Ramírez  :  1990   Date:  2023  Cancelled visits to date:1  No-shows to date: 0    For today's appointment patient:  [x]  Cancelled  []  Rescheduled appointment  []  No-show     Reason given by patient:  []  Patient ill  []  Conflicting appointment  []  No transportation    []  Conflict with work  []  No reason given  []  Other:     Comments:      Electronically signed by:  Petra Carmona PT, PT

## 2023-12-07 ENCOUNTER — HOSPITAL ENCOUNTER (OUTPATIENT)
Dept: PHYSICAL THERAPY | Age: 33
Setting detail: THERAPIES SERIES
Discharge: HOME | End: 2023-12-07
Payer: COMMERCIAL

## 2023-12-07 PROCEDURE — 97112 NEUROMUSCULAR REEDUCATION: CPT

## 2023-12-07 PROCEDURE — 97110 THERAPEUTIC EXERCISES: CPT

## 2023-12-07 NOTE — PLAN OF CARE
to PLOF and recreational activity without symptoms or restrictions. Date range of previous POC Visits: 10/18-    Total Visits: 6    Physical Therapy: TREATMENT/PROGRESS NOTE   Patient: Kalani Astudillo (59 y.o. female)   Treatment Date: 2023   :  1990 MRN: [id number]   Visit #: 6  Insurance Allowable Auth Needed   60 []Yes    [x]No    Insurance: Payor: [employer] / Plan: [facility] [place] PPO / Product Type: *No Product type* /   Insurance ID: [id number] - ([address] [place] [address])  Secondary Insurance (if applicable):    Treatment Diagnosis:     ICD-10-CM    1. Sacroiliac joint dysfunction  M53.3          Medical Diagnosis:    Chronic low back pain, unspecified back pain laterality, unspecified whether sciatica present [M54.50, G89.29]  Chronic SI joint pain [M53.3, G89.29]   Referring Physician: Mckenzie Sandhu PA-C  PCP: Shawna Anaya DO                             Plan of care signed (Y/N): Y    Date of Patient follow up with Physician: prn     Progress Report/POC: YES  POC update due: 2024 (OR 10 visits /OR Tova Thornton, whichever is less)    Precautions/ Contra-indications:                                                                                          Latex allergy:  NO  Pacemaker:    NO  Contraindications for Manipulation: None  Other:     Preferred Language for Healthcare:   [x]English       []other:    SUBJECTIVE EXAMINATION     Patient Report/Comments:  Pt notes that she feels okay. She notes small increases in pain every now and then but much better overall. Pt notes that her pain will mostly come back after a long day of working.  Pt notes that she is at 70-75% back to normal.      Test used Initial score  10/18/23 2023   Pain Summary VAS 9 2-5/10   Functional questionnaire Modified Oswestry  20% deficit   Other:                OBJECTIVE EXAMINATION     Test measurements:     23        AROM L AROM R Notes PROM L PROM R Notes                        LUMBAR Flexion 70

## 2023-12-14 ENCOUNTER — HOSPITAL ENCOUNTER (OUTPATIENT)
Dept: PHYSICAL THERAPY | Age: 33
Setting detail: THERAPIES SERIES
Discharge: HOME | End: 2023-12-14
Payer: COMMERCIAL

## 2023-12-14 PROCEDURE — 97110 THERAPEUTIC EXERCISES: CPT

## 2023-12-14 PROCEDURE — 97140 MANUAL THERAPY 1/> REGIONS: CPT

## 2023-12-14 NOTE — FLOWSHEET NOTE
Patient is not progressing as expected and requires additional follow up with physician  [] Other:     CHARGE CAPTURE     CHARGE GRID   CPT Code (TIMED) minutes # CPT Code (UNTIMED) #     [x] Therex (05012)  30 2  [] EVAL:LOW (87852 - Typically 20 minutes face-to-face)     [] Neuromusc. Re-ed (70487)    [] Re-Eval (65994)     [x] Manual (32980) 12 1  [] Estim Unattended (12960)     [] Ther. Act (45824)    [] Sharif Hinders. Traction (63956)     [] Gait (79778)    [] Dry Needle 1-2 muscle (85343)     [] Aquatic Therex (85624)    [] Dry Needle 3+ muscle (17537)     [] Iontophoresis (97655)    [] VASO (46605)     [] Ultrasound (64154)    [] Group Therapy (45847)     [] Estim Attended (13399)    [] Other: Total Timed Code Tx Minutes 42 3       Total Treatment Minutes 45        Charge Justification:  (76104) THERAPEUTIC EXERCISE - Provided verbal/tactile cueing for activities related to strengthening, flexibility, endurance, ROM performed to prevent loss of range of motion, maintain or improve muscular strength or increase flexibility, following either an injury or surgery.    (58128) NEUROMUSCULAR RE-EDUCATION- Therapeutic procedure, 1 or more areas, each 15 minutes; neuromuscular reeducation of movement, balance, coordination, kinesthetic sense, posture, and/or proprioception for sitting and/or standing activities  (15911) 164 [facility]- Reviewed/Progressed HEP activities related to neuromuscular reeducation of movement, balance, coordination, kinesthetic sense, posture, and/or proprioception for sitting and/or standing activities    (59533) MANUAL THERAPY-  Manual therapy techniques, 1 or more regions, each 15 minutes (Mobilization/manipulation, manual lymphatic drainage, manual traction) for the purpose of modulating pain, promoting relaxation,  increasing ROM, reducing/eliminating soft tissue swelling/inflammation/restriction, improving soft tissue extensibility and allowing for proper ROM for normal function with

## 2023-12-28 ENCOUNTER — APPOINTMENT (OUTPATIENT)
Dept: PHYSICAL THERAPY | Age: 33
End: 2023-12-28
Payer: COMMERCIAL

## 2024-04-15 ENCOUNTER — OFFICE VISIT (OUTPATIENT)
Dept: URGENT CARE | Age: 34
End: 2024-04-15

## 2024-04-15 VITALS
HEIGHT: 65 IN | HEART RATE: 75 BPM | WEIGHT: 197 LBS | DIASTOLIC BLOOD PRESSURE: 74 MMHG | SYSTOLIC BLOOD PRESSURE: 115 MMHG | TEMPERATURE: 98.3 F | BODY MASS INDEX: 32.82 KG/M2 | OXYGEN SATURATION: 97 %

## 2024-04-15 DIAGNOSIS — S05.01XA ABRASION OF RIGHT CORNEA, INITIAL ENCOUNTER: Primary | ICD-10-CM

## 2024-04-15 DIAGNOSIS — H57.11 EYE PAIN, RIGHT: ICD-10-CM

## 2024-04-15 RX ORDER — POLYMYXIN B SULFATE AND TRIMETHOPRIM 1; 10000 MG/ML; [USP'U]/ML
1 SOLUTION OPHTHALMIC EVERY 4 HOURS
Qty: 10 ML | Refills: 0 | Status: SHIPPED | OUTPATIENT
Start: 2024-04-15

## 2024-04-15 ASSESSMENT — ENCOUNTER SYMPTOMS
PHOTOPHOBIA: 1
EYE REDNESS: 1
EYE PAIN: 1

## 2024-04-15 NOTE — PATIENT INSTRUCTIONS
Keep hydrated, tylenol or ibuprofen (if no contraindications) as needed if pain or fever.. Take medication as prescribed.. Follow up in 5 days    Return sooner   if symptoms worse/eye looking worse  Go to ER if increasing pain, fever, facial rash, headache, increasing redness/swelling around eye, decreased vision or if pupil becomes cloudy as discussed .  Discussed importance of of washing hands.    Wear sunglasses for comfort

## 2024-04-15 NOTE — PROGRESS NOTES
Jd Oquendo (:  1990) is a 33 y.o. female,New patient, here for evaluation of the following chief complaint(s):  Otalgia (Rt eye injury/stuck with foreign body-poptart)      ASSESSMENT/PLAN:    ICD-10-CM    1. Abrasion of right cornea, initial encounter  S05.01XA trimethoprim-polymyxin b (POLYTRIM) 38825-9.1 UNIT/ML-% ophthalmic solution      2. Eye pain, right  H57.11            Keep hydrated, tylenol or ibuprofen (if no contraindications) as needed if pain or fever.. Take medication as prescribed.. Follow up in 5 days    Return sooner   if symptoms worse/eye looking worse  Go to ER if increasing pain, fever, facial rash, headache, increasing redness/swelling around eye, decreased vision or if pupil becomes cloudy as discussed .  Discussed importance of of washing hands.      Wear sunglasses for comfort        SUBJECTIVE/OBJECTIVE:  Patient presents with:  Other: Rt Eye injury     Today   --Patient  was reaching over her child and right eye was swiped by pop tart that child was holding    Eye pain, light sensitive      no contact lens  Eye watery but patient says vision unchanged      History provided by:  Patient      Vitals:    04/15/24 1011   BP: 115/74   Pulse: 75   Temp: 98.3 °F (36.8 °C)   TempSrc: Oral   SpO2: 97%   Weight: 89.4 kg (197 lb)   Height: 1.651 m (5' 5\")       Review of Systems   Eyes:  Positive for photophobia, pain and redness. Negative for visual disturbance.       Physical Exam    Physical  Vitals signs: reviewed  Constitutional:  appearance: well nourished ..  does not appear acutely ill      Topical tetracaine drops put into eye for comfort and to allow exam    Right  Eyes:            LID normal / everted: clear, no foreign body            Pupil: equal-round-reactive to light, * sensitive to light, EOMI            Cornea: clear,  no foreign body  Fluorescein drops (+) for corneal abrasions (x2) one at 7p and another at 8 pm            Sclera: clear,  injected, non icteric    Ears:

## 2024-09-20 ENCOUNTER — OFFICE VISIT (OUTPATIENT)
Dept: FAMILY MEDICINE CLINIC | Age: 34
End: 2024-09-20
Payer: COMMERCIAL

## 2024-09-20 VITALS
DIASTOLIC BLOOD PRESSURE: 74 MMHG | RESPIRATION RATE: 16 BRPM | WEIGHT: 191.4 LBS | SYSTOLIC BLOOD PRESSURE: 107 MMHG | BODY MASS INDEX: 31.85 KG/M2 | TEMPERATURE: 98.9 F | HEART RATE: 69 BPM | OXYGEN SATURATION: 96 %

## 2024-09-20 DIAGNOSIS — R10.10 PAIN OF UPPER ABDOMEN: Primary | ICD-10-CM

## 2024-09-20 DIAGNOSIS — R10.13 DYSPEPSIA: ICD-10-CM

## 2024-09-20 PROCEDURE — 99213 OFFICE O/P EST LOW 20 MIN: CPT | Performed by: FAMILY MEDICINE

## 2024-09-20 RX ORDER — OMEPRAZOLE 10 MG/1
10 CAPSULE, DELAYED RELEASE ORAL DAILY
Qty: 30 CAPSULE | Refills: 3 | Status: SHIPPED | OUTPATIENT
Start: 2024-09-20

## 2024-09-20 RX ORDER — HYDROXYZINE HYDROCHLORIDE 10 MG/1
10 TABLET, FILM COATED ORAL 3 TIMES DAILY PRN
COMMUNITY

## 2024-09-20 RX ORDER — ASPIRIN 81 MG/1
81 TABLET ORAL DAILY
COMMUNITY
Start: 2024-09-05

## 2024-09-20 SDOH — ECONOMIC STABILITY: FOOD INSECURITY: WITHIN THE PAST 12 MONTHS, THE FOOD YOU BOUGHT JUST DIDN'T LAST AND YOU DIDN'T HAVE MONEY TO GET MORE.: NEVER TRUE

## 2024-09-20 SDOH — ECONOMIC STABILITY: FOOD INSECURITY: WITHIN THE PAST 12 MONTHS, YOU WORRIED THAT YOUR FOOD WOULD RUN OUT BEFORE YOU GOT MONEY TO BUY MORE.: NEVER TRUE

## 2024-09-20 SDOH — ECONOMIC STABILITY: INCOME INSECURITY: HOW HARD IS IT FOR YOU TO PAY FOR THE VERY BASICS LIKE FOOD, HOUSING, MEDICAL CARE, AND HEATING?: NOT HARD AT ALL

## 2024-09-20 ASSESSMENT — PATIENT HEALTH QUESTIONNAIRE - PHQ9
10. IF YOU CHECKED OFF ANY PROBLEMS, HOW DIFFICULT HAVE THESE PROBLEMS MADE IT FOR YOU TO DO YOUR WORK, TAKE CARE OF THINGS AT HOME, OR GET ALONG WITH OTHER PEOPLE: NOT DIFFICULT AT ALL
6. FEELING BAD ABOUT YOURSELF - OR THAT YOU ARE A FAILURE OR HAVE LET YOURSELF OR YOUR FAMILY DOWN: NOT AT ALL
1. LITTLE INTEREST OR PLEASURE IN DOING THINGS: NOT AT ALL
4. FEELING TIRED OR HAVING LITTLE ENERGY: NOT AT ALL
SUM OF ALL RESPONSES TO PHQ QUESTIONS 1-9: 0
2. FEELING DOWN, DEPRESSED OR HOPELESS: NOT AT ALL
SUM OF ALL RESPONSES TO PHQ QUESTIONS 1-9: 0
7. TROUBLE CONCENTRATING ON THINGS, SUCH AS READING THE NEWSPAPER OR WATCHING TELEVISION: NOT AT ALL
5. POOR APPETITE OR OVEREATING: NOT AT ALL
3. TROUBLE FALLING OR STAYING ASLEEP: NOT AT ALL
SUM OF ALL RESPONSES TO PHQ QUESTIONS 1-9: 0
9. THOUGHTS THAT YOU WOULD BE BETTER OFF DEAD, OR OF HURTING YOURSELF: NOT AT ALL
SUM OF ALL RESPONSES TO PHQ QUESTIONS 1-9: 0
SUM OF ALL RESPONSES TO PHQ9 QUESTIONS 1 & 2: 0

## 2024-09-20 ASSESSMENT — ENCOUNTER SYMPTOMS: BLOOD IN STOOL: 0
